# Patient Record
Sex: MALE | Race: WHITE
[De-identification: names, ages, dates, MRNs, and addresses within clinical notes are randomized per-mention and may not be internally consistent; named-entity substitution may affect disease eponyms.]

---

## 2021-07-23 ENCOUNTER — HOSPITAL ENCOUNTER (OUTPATIENT)
Dept: HOSPITAL 53 - M SLEEP | Age: 71
End: 2021-07-23
Attending: NURSE PRACTITIONER
Payer: COMMERCIAL

## 2021-07-23 DIAGNOSIS — R06.83: Primary | ICD-10-CM

## 2021-07-28 NOTE — SLEEPCENT
DATE: 07/23/2021



ORDERED BY:  MEAGHAN Marie 



Nocturnal polysomnography was performed for evaluation of sleep physiology in

this patient with a history of excessive somnolence, snoring and nonrestorative

sleep.



Seven hours and 40 minutes of data were reviewed.  There were only 181 minutes

of sleep identified. Sleep latency was quite prolonged at 68 minutes. REM

latency was somewhat prolonged at 162 minutes. Sleep architecture showed poor

progression.  One REM cycle was noted. Overall sleep efficiency was only 39.7%.

The electrocardiogram showed a sinus rhythm with an average heart rate of 58

beats per minute.  EEG showed normal waveforms for wake and sleep. There were

only 4 respiratory events identified of 10 seconds in duration or greater for

an apnea-hypopnea index within normal limits at 1.3. The events seen were

hypopneic. Arousals from respiratory events when arousals from snoring were

included occurred 3 times per hour. There were no oxygen desaturations below

90% and there was some minor limb activity with a limb movement arousal index

of 2.3.



IMPRESSION:  Borderline normal nocturnal polysomnography with snoring.



RECOMMENDATION: The patient had difficulty establishing and maintain sleep in

[facility] on [facility] first night.  If sleep symptoms persist, repeat testing

may be helpful at identifying mild disease.